# Patient Record
Sex: MALE | Race: BLACK OR AFRICAN AMERICAN | NOT HISPANIC OR LATINO | URBAN - METROPOLITAN AREA
[De-identification: names, ages, dates, MRNs, and addresses within clinical notes are randomized per-mention and may not be internally consistent; named-entity substitution may affect disease eponyms.]

---

## 2020-07-26 ENCOUNTER — EMERGENCY (EMERGENCY)
Facility: HOSPITAL | Age: 25
LOS: 1 days | Discharge: HOME | End: 2020-07-26
Attending: EMERGENCY MEDICINE | Admitting: EMERGENCY MEDICINE
Payer: COMMERCIAL

## 2020-07-26 VITALS
OXYGEN SATURATION: 97 % | DIASTOLIC BLOOD PRESSURE: 93 MMHG | TEMPERATURE: 99 F | RESPIRATION RATE: 15 BRPM | SYSTOLIC BLOOD PRESSURE: 137 MMHG | HEART RATE: 74 BPM

## 2020-07-26 VITALS
DIASTOLIC BLOOD PRESSURE: 99 MMHG | SYSTOLIC BLOOD PRESSURE: 146 MMHG | HEART RATE: 57 BPM | OXYGEN SATURATION: 100 % | RESPIRATION RATE: 16 BRPM | TEMPERATURE: 98 F

## 2020-07-26 LAB
ALBUMIN SERPL ELPH-MCNC: 3.7 G/DL — SIGNIFICANT CHANGE UP (ref 3.3–5)
ALP SERPL-CCNC: 63 U/L — SIGNIFICANT CHANGE UP (ref 40–120)
ALT FLD-CCNC: 20 U/L — SIGNIFICANT CHANGE UP (ref 12–78)
ANION GAP SERPL CALC-SCNC: 4 MMOL/L — LOW (ref 5–17)
APPEARANCE UR: CLEAR — SIGNIFICANT CHANGE UP
AST SERPL-CCNC: 14 U/L — LOW (ref 15–37)
BACTERIA # UR AUTO: NEGATIVE — SIGNIFICANT CHANGE UP
BASOPHILS # BLD AUTO: 0.02 K/UL — SIGNIFICANT CHANGE UP (ref 0–0.2)
BASOPHILS NFR BLD AUTO: 0.3 % — SIGNIFICANT CHANGE UP (ref 0–2)
BILIRUB SERPL-MCNC: 0.7 MG/DL — SIGNIFICANT CHANGE UP (ref 0.2–1.2)
BILIRUB UR-MCNC: NEGATIVE — SIGNIFICANT CHANGE UP
BUN SERPL-MCNC: 14 MG/DL — SIGNIFICANT CHANGE UP (ref 7–23)
CALCIUM SERPL-MCNC: 8.6 MG/DL — SIGNIFICANT CHANGE UP (ref 8.5–10.1)
CHLORIDE SERPL-SCNC: 104 MMOL/L — SIGNIFICANT CHANGE UP (ref 96–108)
CO2 SERPL-SCNC: 32 MMOL/L — HIGH (ref 22–31)
COLOR SPEC: YELLOW — SIGNIFICANT CHANGE UP
CREAT SERPL-MCNC: 1.1 MG/DL — SIGNIFICANT CHANGE UP (ref 0.5–1.3)
DIFF PNL FLD: NEGATIVE — SIGNIFICANT CHANGE UP
EOSINOPHIL # BLD AUTO: 0.04 K/UL — SIGNIFICANT CHANGE UP (ref 0–0.5)
EOSINOPHIL NFR BLD AUTO: 0.6 % — SIGNIFICANT CHANGE UP (ref 0–6)
EPI CELLS # UR: SIGNIFICANT CHANGE UP
GLUCOSE SERPL-MCNC: 81 MG/DL — SIGNIFICANT CHANGE UP (ref 70–99)
GLUCOSE UR QL: NEGATIVE — SIGNIFICANT CHANGE UP
HCT VFR BLD CALC: 47.6 % — SIGNIFICANT CHANGE UP (ref 39–50)
HGB BLD-MCNC: 15.3 G/DL — SIGNIFICANT CHANGE UP (ref 13–17)
HIV 1 & 2 AB SERPL IA.RAPID: SIGNIFICANT CHANGE UP
IMM GRANULOCYTES NFR BLD AUTO: 0.2 % — SIGNIFICANT CHANGE UP (ref 0–1.5)
KETONES UR-MCNC: NEGATIVE — SIGNIFICANT CHANGE UP
LEUKOCYTE ESTERASE UR-ACNC: NEGATIVE — SIGNIFICANT CHANGE UP
LIDOCAIN IGE QN: 129 U/L — SIGNIFICANT CHANGE UP (ref 73–393)
LYMPHOCYTES # BLD AUTO: 3.3 K/UL — SIGNIFICANT CHANGE UP (ref 1–3.3)
LYMPHOCYTES # BLD AUTO: 52.6 % — HIGH (ref 13–44)
MCHC RBC-ENTMCNC: 27.2 PG — SIGNIFICANT CHANGE UP (ref 27–34)
MCHC RBC-ENTMCNC: 32.1 GM/DL — SIGNIFICANT CHANGE UP (ref 32–36)
MCV RBC AUTO: 84.7 FL — SIGNIFICANT CHANGE UP (ref 80–100)
MONOCYTES # BLD AUTO: 0.38 K/UL — SIGNIFICANT CHANGE UP (ref 0–0.9)
MONOCYTES NFR BLD AUTO: 6.1 % — SIGNIFICANT CHANGE UP (ref 2–14)
NEUTROPHILS # BLD AUTO: 2.52 K/UL — SIGNIFICANT CHANGE UP (ref 1.8–7.4)
NEUTROPHILS NFR BLD AUTO: 40.2 % — LOW (ref 43–77)
NITRITE UR-MCNC: NEGATIVE — SIGNIFICANT CHANGE UP
NRBC # BLD: 0 /100 WBCS — SIGNIFICANT CHANGE UP (ref 0–0)
PH UR: 7 — SIGNIFICANT CHANGE UP (ref 5–8)
PLATELET # BLD AUTO: 211 K/UL — SIGNIFICANT CHANGE UP (ref 150–400)
POTASSIUM SERPL-MCNC: 4.1 MMOL/L — SIGNIFICANT CHANGE UP (ref 3.5–5.3)
POTASSIUM SERPL-SCNC: 4.1 MMOL/L — SIGNIFICANT CHANGE UP (ref 3.5–5.3)
PROT SERPL-MCNC: 7.3 G/DL — SIGNIFICANT CHANGE UP (ref 6–8.3)
PROT UR-MCNC: 15
RBC # BLD: 5.62 M/UL — SIGNIFICANT CHANGE UP (ref 4.2–5.8)
RBC # FLD: 13.6 % — SIGNIFICANT CHANGE UP (ref 10.3–14.5)
RBC CASTS # UR COMP ASSIST: NEGATIVE /HPF — SIGNIFICANT CHANGE UP (ref 0–4)
SODIUM SERPL-SCNC: 140 MMOL/L — SIGNIFICANT CHANGE UP (ref 135–145)
SP GR SPEC: 1 — LOW (ref 1.01–1.02)
T PALLIDUM AB TITR SER: NEGATIVE — SIGNIFICANT CHANGE UP
UROBILINOGEN FLD QL: NEGATIVE — SIGNIFICANT CHANGE UP
WBC # BLD: 6.27 K/UL — SIGNIFICANT CHANGE UP (ref 3.8–10.5)
WBC # FLD AUTO: 6.27 K/UL — SIGNIFICANT CHANGE UP (ref 3.8–10.5)
WBC UR QL: NEGATIVE — SIGNIFICANT CHANGE UP

## 2020-07-26 PROCEDURE — 87491 CHLMYD TRACH DNA AMP PROBE: CPT

## 2020-07-26 PROCEDURE — 96375 TX/PRO/DX INJ NEW DRUG ADDON: CPT

## 2020-07-26 PROCEDURE — 76870 US EXAM SCROTUM: CPT

## 2020-07-26 PROCEDURE — 96365 THER/PROPH/DIAG IV INF INIT: CPT

## 2020-07-26 PROCEDURE — 74176 CT ABD & PELVIS W/O CONTRAST: CPT

## 2020-07-26 PROCEDURE — 80053 COMPREHEN METABOLIC PANEL: CPT

## 2020-07-26 PROCEDURE — 86780 TREPONEMA PALLIDUM: CPT

## 2020-07-26 PROCEDURE — 96361 HYDRATE IV INFUSION ADD-ON: CPT

## 2020-07-26 PROCEDURE — 99284 EMERGENCY DEPT VISIT MOD MDM: CPT | Mod: 25

## 2020-07-26 PROCEDURE — 87591 N.GONORRHOEAE DNA AMP PROB: CPT

## 2020-07-26 PROCEDURE — 99285 EMERGENCY DEPT VISIT HI MDM: CPT

## 2020-07-26 PROCEDURE — 83690 ASSAY OF LIPASE: CPT

## 2020-07-26 PROCEDURE — 76870 US EXAM SCROTUM: CPT | Mod: 26

## 2020-07-26 PROCEDURE — 85027 COMPLETE CBC AUTOMATED: CPT

## 2020-07-26 PROCEDURE — 36415 COLL VENOUS BLD VENIPUNCTURE: CPT

## 2020-07-26 PROCEDURE — 87086 URINE CULTURE/COLONY COUNT: CPT

## 2020-07-26 PROCEDURE — 81001 URINALYSIS AUTO W/SCOPE: CPT

## 2020-07-26 PROCEDURE — 86703 HIV-1/HIV-2 1 RESULT ANTBDY: CPT

## 2020-07-26 PROCEDURE — 74176 CT ABD & PELVIS W/O CONTRAST: CPT | Mod: 26

## 2020-07-26 RX ORDER — SODIUM CHLORIDE 9 MG/ML
1000 INJECTION INTRAMUSCULAR; INTRAVENOUS; SUBCUTANEOUS ONCE
Refills: 0 | Status: COMPLETED | OUTPATIENT
Start: 2020-07-26 | End: 2020-07-26

## 2020-07-26 RX ORDER — CEFTRIAXONE 500 MG/1
1000 INJECTION, POWDER, FOR SOLUTION INTRAMUSCULAR; INTRAVENOUS ONCE
Refills: 0 | Status: COMPLETED | OUTPATIENT
Start: 2020-07-26 | End: 2020-07-26

## 2020-07-26 RX ORDER — KETOROLAC TROMETHAMINE 30 MG/ML
15 SYRINGE (ML) INJECTION ONCE
Refills: 0 | Status: DISCONTINUED | OUTPATIENT
Start: 2020-07-26 | End: 2020-07-26

## 2020-07-26 RX ORDER — CEFUROXIME AXETIL 250 MG
1 TABLET ORAL
Qty: 14 | Refills: 0
Start: 2020-07-26 | End: 2020-08-01

## 2020-07-26 RX ADMIN — CEFTRIAXONE 100 MILLIGRAM(S): 500 INJECTION, POWDER, FOR SOLUTION INTRAMUSCULAR; INTRAVENOUS at 15:22

## 2020-07-26 RX ADMIN — SODIUM CHLORIDE 1000 MILLILITER(S): 9 INJECTION INTRAMUSCULAR; INTRAVENOUS; SUBCUTANEOUS at 13:30

## 2020-07-26 RX ADMIN — SODIUM CHLORIDE 1000 MILLILITER(S): 9 INJECTION INTRAMUSCULAR; INTRAVENOUS; SUBCUTANEOUS at 15:16

## 2020-07-26 RX ADMIN — CEFTRIAXONE 1000 MILLIGRAM(S): 500 INJECTION, POWDER, FOR SOLUTION INTRAMUSCULAR; INTRAVENOUS at 16:14

## 2020-07-26 RX ADMIN — Medication 15 MILLIGRAM(S): at 15:22

## 2020-07-26 RX ADMIN — Medication 15 MILLIGRAM(S): at 15:23

## 2020-07-26 NOTE — ED PROVIDER NOTE - CARE PROVIDERS DIRECT ADDRESSES
[email or web address]

## 2020-07-26 NOTE — ED PROVIDER NOTE - CARE PROVIDER_API CALL
Kiley Kahn  INTERNAL MEDICINE  [address] [place] [address]   [place] [address]  Phone: [phone]  Fax: [phone]  Follow Up Time:     Xavier García)  Urology  [address] [facility] [address]  [place] [address]  Phone: [phone]  Fax: [phone]  Follow Up Time:

## 2020-07-26 NOTE — ED PROVIDER NOTE - NSFOLLOWUPINSTRUCTIONS_ED_ALL_ED_FT
1) Follow-up with your Primary Medical Doctor or referred doctor. Call today / next business day for prompt follow-up.  2) Return to Emergency room for any worsening or persistent pain, weakness, fever, dizziness, passing out, difficulty breathing or any other concerning symptoms.  3) See attached instruction sheets for additional information, including information regarding signs and symptoms to look out for, reasons to seek immediate care and other important instructions.  4) Follow-up with Urology - call tomorrow for prompt follow-up  5) Ceftin twice daily for 7 days

## 2020-07-26 NOTE — ED PROVIDER NOTE - CHPI ED SYMPTOMS NEG
no abdominal distension/no fever/no vomiting/no diarrhea/no burning urination/no hematuria/no blood in stool

## 2020-07-26 NOTE — ED PROVIDER NOTE - OBJECTIVE STATEMENT
23 yo M p/w generalized testicular pain x past ~ 2 -3 days. Pain also in lower abd. no known fever/chills. no cp/sob/palp . no neck / back pain. no dysuria / hematuria. no n/v/d. no change in appetite. no known risky sexual encounter. no hx std. No numb/ting/focal weak. No agg/allev factors. No other inj or co.

## 2020-07-26 NOTE — ED ADULT TRIAGE NOTE - CHIEF COMPLAINT QUOTE
referred from urgent care for abdominal pain and testicular pain x 1 week. urgent care reports normal testicular exam

## 2020-07-26 NOTE — ED PROVIDER NOTE - GENITOURINARY, MLM
No penile discharge. bl testicles with nl cremesteric bl, non-tender. No edema / crepitus. No erythema.

## 2020-07-26 NOTE — ED PROVIDER NOTE - PATIENT PORTAL LINK FT
You can access the FollowMyHealth Patient Portal offered by [facility] by registering at the following website: [email or web address]. By joining [employer]’s FollowMyHealth portal, you will also be able to view your health information using other applications (apps) compatible with our system.

## 2020-07-26 NOTE — ED ADULT NURSE NOTE - CHPI ED NUR SYMPTOMS NEG
no diarrhea/no nausea/no abdominal distension/no fever/no blood in stool/no chills/no dysuria/no hematuria

## 2020-07-26 NOTE — ED PROVIDER NOTE - PROGRESS NOTE DETAILS
Reevaluated patient at bedside.  Patient feeling much improved. no acute co or changes. Discussed the results of all diagnostic testing in ED.  An opportunity to ask questions was given.  Discussed the nature of diagnostic testing in the abdomen and the importance of continued reevaluation.  Understanding of the potential risk of occult pathology was verbalized and the patient will continue to follow-up as an outpatient for further workup and evaluation until resolution.  Discussed the importance of prompt, close medical follow-up.  Patient will return with any changes, concerns or persistent / worsening symptoms.

## 2020-07-27 LAB
C TRACH RRNA SPEC QL NAA+PROBE: SIGNIFICANT CHANGE UP
CULTURE RESULTS: SIGNIFICANT CHANGE UP
N GONORRHOEA RRNA SPEC QL NAA+PROBE: SIGNIFICANT CHANGE UP
SPECIMEN SOURCE: SIGNIFICANT CHANGE UP
SPECIMEN SOURCE: SIGNIFICANT CHANGE UP

## 2022-09-29 PROBLEM — Z00.00 ENCOUNTER FOR PREVENTIVE HEALTH EXAMINATION: Status: ACTIVE | Noted: 2022-09-29

## 2022-12-06 ENCOUNTER — OFFICE (OUTPATIENT)
Dept: URBAN - METROPOLITAN AREA CLINIC 63 | Facility: CLINIC | Age: 27
Setting detail: OPHTHALMOLOGY
End: 2022-12-06
Payer: COMMERCIAL

## 2022-12-06 DIAGNOSIS — H25.12: ICD-10-CM

## 2022-12-06 DIAGNOSIS — H16.223: ICD-10-CM

## 2022-12-06 DIAGNOSIS — H40.052: ICD-10-CM

## 2022-12-06 DIAGNOSIS — H31.002: ICD-10-CM

## 2022-12-06 DIAGNOSIS — H40.013: ICD-10-CM

## 2022-12-06 PROCEDURE — 92133 CPTRZD OPH DX IMG PST SGM ON: CPT | Performed by: STUDENT IN AN ORGANIZED HEALTH CARE EDUCATION/TRAINING PROGRAM

## 2022-12-06 PROCEDURE — 92014 COMPRE OPH EXAM EST PT 1/>: CPT | Performed by: STUDENT IN AN ORGANIZED HEALTH CARE EDUCATION/TRAINING PROGRAM

## 2022-12-06 ASSESSMENT — PACHYMETRY
OS_CT_CORRECTION: -4
OS_CT_UM: 606

## 2022-12-06 ASSESSMENT — SUPERFICIAL PUNCTATE KERATITIS (SPK)
OS_SPK: T
OD_SPK: T

## 2022-12-06 ASSESSMENT — TONOMETRY
OD_IOP_MMHG: 18
OS_IOP_MMHG: 18

## 2022-12-06 ASSESSMENT — CONFRONTATIONAL VISUAL FIELD TEST (CVF)
OS_FINDINGS: FULL
OD_FINDINGS: FULL

## 2022-12-06 ASSESSMENT — TEAR BREAK UP TIME (TBUT)
OD_TBUT: 1+
OS_TBUT: 1+

## 2022-12-07 ASSESSMENT — REFRACTION_MANIFEST
OS_CYLINDER: -2.00
OD_SPHERE: PLANO
OS_VA1: 20/30
OD_CYLINDER: -0.50
OS_SPHERE: +0.50
OS_AXIS: 018
OD_AXIS: 129
OD_VA1: 20/20

## 2022-12-07 ASSESSMENT — REFRACTION_CURRENTRX
OD_SPHERE: PLANO
OS_OVR_VA: 20/
OD_CYLINDER: -0.50
OS_SPHERE: +0.50
OD_OVR_VA: 20/
OD_AXIS: 128
OS_AXIS: 025
OS_CYLINDER: -2.00

## 2022-12-07 ASSESSMENT — KERATOMETRY
OD_AXISANGLE_DEGREES: 073
OS_AXISANGLE_DEGREES: 106
OD_K2POWER_DIOPTERS: 42.50
OS_K2POWER_DIOPTERS: 44.50
OS_K1POWER_DIOPTERS: 41.75
OD_K1POWER_DIOPTERS: 41.50

## 2022-12-07 ASSESSMENT — AXIALLENGTH_DERIVED
OS_AL: 23.9292
OS_AL: 24.1313
OD_AL: 24.1051

## 2022-12-07 ASSESSMENT — REFRACTION_AUTOREFRACTION
OD_CYLINDER: -0.25
OD_SPHERE: +0.25
OS_SPHERE: +0.50
OS_CYLINDER: -3.00
OS_AXIS: 018
OD_AXIS: 121

## 2022-12-07 ASSESSMENT — VISUAL ACUITY
OD_BCVA: 20/40
OS_BCVA: 20/20

## 2022-12-07 ASSESSMENT — SPHEQUIV_DERIVED
OS_SPHEQUIV: -1
OS_SPHEQUIV: -0.5
OD_SPHEQUIV: 0.125

## 2023-05-31 ENCOUNTER — OFFICE (OUTPATIENT)
Dept: URBAN - METROPOLITAN AREA CLINIC 63 | Facility: CLINIC | Age: 28
Setting detail: OPHTHALMOLOGY
End: 2023-05-31
Payer: COMMERCIAL

## 2023-05-31 DIAGNOSIS — B30.9: ICD-10-CM

## 2023-05-31 PROCEDURE — 92012 INTRM OPH EXAM EST PATIENT: CPT | Performed by: STUDENT IN AN ORGANIZED HEALTH CARE EDUCATION/TRAINING PROGRAM

## 2023-05-31 ASSESSMENT — TEAR BREAK UP TIME (TBUT)
OD_TBUT: 1+
OS_TBUT: 1+

## 2023-05-31 ASSESSMENT — SPHEQUIV_DERIVED
OS_SPHEQUIV: -1.625
OS_SPHEQUIV: -0.5
OD_SPHEQUIV: -0.375

## 2023-05-31 ASSESSMENT — REFRACTION_MANIFEST
OD_CYLINDER: -0.50
OS_VA1: 20/30
OS_AXIS: 018
OS_SPHERE: +0.50
OD_SPHERE: PLANO
OS_CYLINDER: -2.00
OD_AXIS: 129
OD_VA1: 20/20

## 2023-05-31 ASSESSMENT — REFRACTION_CURRENTRX
OD_AXIS: 128
OD_SPHERE: PLANO
OS_SPHERE: +0.50
OS_OVR_VA: 20/
OD_OVR_VA: 20/
OS_AXIS: 025
OD_CYLINDER: -0.50
OS_CYLINDER: -2.00

## 2023-05-31 ASSESSMENT — TONOMETRY
OD_IOP_MMHG: 14
OS_IOP_MMHG: 15

## 2023-05-31 ASSESSMENT — SUPERFICIAL PUNCTATE KERATITIS (SPK)
OD_SPK: T
OS_SPK: T

## 2023-05-31 ASSESSMENT — REFRACTION_AUTOREFRACTION
OS_CYLINDER: -4.25
OS_SPHERE: +0.50
OS_AXIS: 011
OD_CYLINDER: -0.75
OD_AXIS: 145
OD_SPHERE: 0.00

## 2023-05-31 ASSESSMENT — PACHYMETRY
OS_CT_CORRECTION: -4
OS_CT_UM: 606

## 2023-05-31 ASSESSMENT — VISUAL ACUITY
OS_BCVA: 20/20
OD_BCVA: 20/40-1

## 2023-06-04 ENCOUNTER — OFFICE (OUTPATIENT)
Dept: URBAN - METROPOLITAN AREA CLINIC 63 | Facility: CLINIC | Age: 28
Setting detail: OPHTHALMOLOGY
End: 2023-06-04
Payer: COMMERCIAL

## 2023-06-04 DIAGNOSIS — B30.9: ICD-10-CM

## 2023-06-04 PROCEDURE — 92012 INTRM OPH EXAM EST PATIENT: CPT | Performed by: STUDENT IN AN ORGANIZED HEALTH CARE EDUCATION/TRAINING PROGRAM

## 2023-06-04 ASSESSMENT — REFRACTION_MANIFEST
OD_SPHERE: PLANO
OD_VA1: 20/20
OD_CYLINDER: -0.50
OS_AXIS: 018
OS_CYLINDER: -2.00
OS_VA1: 20/30
OS_SPHERE: +0.50
OD_AXIS: 129

## 2023-06-04 ASSESSMENT — TEAR BREAK UP TIME (TBUT)
OD_TBUT: 1+
OS_TBUT: 1+

## 2023-06-04 ASSESSMENT — VISUAL ACUITY
OD_BCVA: 20/40-1
OS_BCVA: 20/20

## 2023-06-04 ASSESSMENT — SUPERFICIAL PUNCTATE KERATITIS (SPK)
OS_SPK: T
OD_SPK: T

## 2023-06-04 ASSESSMENT — REFRACTION_AUTOREFRACTION
OD_AXIS: 104
OS_AXIS: 015
OS_CYLINDER: -3.50
OD_CYLINDER: -0.75
OD_SPHERE: +0.25
OS_SPHERE: +0.25

## 2023-06-04 ASSESSMENT — AXIALLENGTH_DERIVED
OS_AL: 23.8821
OS_AL: 24.2881
OD_AL: 24.2556

## 2023-06-04 ASSESSMENT — KERATOMETRY
OS_K1POWER_DIOPTERS: 41.75
OD_AXISANGLE_DEGREES: 038
OS_K2POWER_DIOPTERS: 44.75
OD_K1POWER_DIOPTERS: 41.50
OD_K2POWER_DIOPTERS: 42.25
OS_AXISANGLE_DEGREES: 104

## 2023-06-04 ASSESSMENT — SPHEQUIV_DERIVED
OD_SPHEQUIV: -0.125
OS_SPHEQUIV: -1.5
OS_SPHEQUIV: -0.5

## 2023-06-04 ASSESSMENT — TONOMETRY
OD_IOP_MMHG: 15
OS_IOP_MMHG: 16

## 2023-06-04 ASSESSMENT — REFRACTION_CURRENTRX
OD_AXIS: 135
OS_VPRISM_DIRECTION: SV
OS_AXIS: 018
OD_OVR_VA: 20/
OD_CYLINDER: -0.50
OS_SPHERE: +0.75
OS_OVR_VA: 20/
OD_SPHERE: PLANO
OD_VPRISM_DIRECTION: SV
OS_CYLINDER: -2.25

## 2023-06-04 ASSESSMENT — PACHYMETRY
OS_CT_CORRECTION: -4
OS_CT_UM: 606

## 2023-06-04 ASSESSMENT — CONFRONTATIONAL VISUAL FIELD TEST (CVF)
OD_FINDINGS: FULL
OS_FINDINGS: FULL

## 2023-08-15 ENCOUNTER — NON-APPOINTMENT (OUTPATIENT)
Age: 28
End: 2023-08-15

## 2023-11-08 ENCOUNTER — EMERGENCY (EMERGENCY)
Facility: HOSPITAL | Age: 28
LOS: 0 days | Discharge: HOME | End: 2023-11-08
Attending: EMERGENCY MEDICINE
Payer: OTHER MISCELLANEOUS

## 2023-11-08 VITALS
HEIGHT: 70 IN | OXYGEN SATURATION: 100 % | DIASTOLIC BLOOD PRESSURE: 114 MMHG | RESPIRATION RATE: 18 BRPM | SYSTOLIC BLOOD PRESSURE: 148 MMHG | WEIGHT: 179.9 LBS | HEART RATE: 70 BPM | TEMPERATURE: 98 F

## 2023-11-08 DIAGNOSIS — M25.561 PAIN IN RIGHT KNEE: ICD-10-CM

## 2023-11-08 DIAGNOSIS — S83.92XA SPRAIN OF UNSPECIFIED SITE OF LEFT KNEE, INITIAL ENCOUNTER: ICD-10-CM
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
## 2023-11-08 DIAGNOSIS — S83.91XA SPRAIN OF UNSPECIFIED SITE OF RIGHT KNEE, INITIAL ENCOUNTER: ICD-10-CM
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
## 2023-11-08 PROBLEM — Z78.9 OTHER SPECIFIED HEALTH STATUS: Chronic | Status: ACTIVE | Noted: 2020-07-26

## 2023-11-08 PROCEDURE — 99284 EMERGENCY DEPT VISIT MOD MDM: CPT

## 2023-11-08 PROCEDURE — 99283 EMERGENCY DEPT VISIT LOW MDM: CPT

## 2023-11-08 PROCEDURE — 73564 X-RAY EXAM KNEE 4 OR MORE: CPT | Mod: 26,RT

## 2023-11-08 PROCEDURE — 99053 MED SERV 10PM-8AM 24 HR FAC: CPT

## 2023-11-08 PROCEDURE — 73564 X-RAY EXAM KNEE 4 OR MORE: CPT | Mod: RT

## 2023-11-08 RX ORDER — IBUPROFEN 200 MG
600 TABLET ORAL ONCE
Refills: 0 | Status: COMPLETED | OUTPATIENT
Start: 2023-11-08 | End: 2023-11-08

## 2023-11-08 RX ORDER — OXYCODONE AND ACETAMINOPHEN 5; 325 MG/1; MG/1
1 TABLET ORAL
Qty: 12 | Refills: 0
Start: 2023-11-08 | End: 2023-11-10

## 2023-11-08 RX ADMIN — Medication 600 MILLIGRAM(S): at 04:18

## 2023-11-08 NOTE — ED PROVIDER NOTE - PHYSICAL EXAMINATION
General: AAOx3, NAD  HEENT: NCAT  Cardiac: Normal rate, normal peripheral perfusion  Respiratory: Normal rate and effort  GI: Soft, nondistended  Neuro: No focal deficits. BRANCH equally x4  MSK: FROMx4, no peripheral edema. +mild ttp medial anterior R knee superior to tibial plateau. No swelling/effusion. Full active and passive ROM. Neg anterior draw.  Skin: No rash

## 2023-11-08 NOTE — ED PROVIDER NOTE - PATIENT PORTAL LINK FT
You can access the FollowMyHealth Patient Portal offered by [facility] by registering at the following website: [email or web address]. By joining [facility]’s FollowMyHealth portal, you will also be able to view your health information using other applications (apps) compatible with our system.
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
## 2023-11-08 NOTE — ED PROVIDER NOTE - CARE PROVIDER_API CALL
German [place]  Orthopaedic Surgery  [address] [place] [id number]  Phone: [phone]  Fax: [phone]  Follow Up Time:

## 2023-11-08 NOTE — ED PROVIDER NOTE - NSFOLLOWUPINSTRUCTIONS_ED_ALL_ED_FT
Return to the Emergency Department for worsening or persistent symptoms, and/or ANY NEW OR CONCERNING SYMPTOMS. If you have issues obtaining follow up, please call: [phone] or [phone]  to obtain a doctor or specialist who takes your insurance in your area.    Knee Sprain    WHAT YOU NEED TO KNOW:    A knee sprain is a stretched or torn ligament in your knee. Ligaments support the knee and keep the joint and bones in the correct position. A knee sprain may involve one or more ligaments.  Knee Anatomy     DISCHARGE INSTRUCTIONS:    Seek care immediately if:    Any part of your leg feels cold, numb, or looks pale.    Call your doctor if:    You have new or increased swelling, bruising, or pain in your knee.    Your symptoms do not improve within 6 weeks, even with treatment.    You have questions or concerns about your condition or care.  Medicines:    NSAIDs, such as ibuprofen, help decrease swelling, pain, and fever. This medicine is available with or without a doctor's order. NSAIDs can cause stomach bleeding or kidney problems in certain people. If you take blood thinner medicine, always ask your healthcare provider if NSAIDs are safe for you. Always read the medicine label and follow directions.    Acetaminophen decreases pain and fever. It is available without a doctor's order. Ask how much to take and how often to take it. Follow directions. Read the labels of all other medicines you are using to see if they also contain acetaminophen, or ask your doctor or pharmacist. Acetaminophen can cause liver damage if not taken correctly.    Prescription pain medicine may be given. Ask your healthcare provider how to take this medicine safely. Some prescription pain medicines contain acetaminophen. Do not take other medicines that contain acetaminophen without talking to your healthcare provider. Too much acetaminophen may cause liver damage. Prescription pain medicine may cause constipation. Ask your healthcare provider how to prevent or treat constipation.    Take your medicine as directed. Contact your healthcare provider if you think your medicine is not helping or if you have side effects. Tell your provider if you are allergic to any medicine. Keep a list of the medicines, vitamins, and herbs you take. Include the amounts, and when and why you take them. Bring the list or the pill bottles to follow-up visits. Carry your medicine list with you in case of an emergency.  A support device such as a splint or brace may be needed. These devices limit movement and protect the joint while it heals. You may be given crutches to use until you can stand on your injured leg without pain.    Physical therapy may be needed. A physical therapist teaches you exercises to help improve movement and strength, and to decrease pain.    Manage a knee sprain:    Rest your knee and do not exercise. Do not walk on your injured leg if you are told to keep weight off your knee. Rest helps decrease swelling and allows the injury to heal. You can do gentle range of motion exercises as directed to prevent stiffness.    Apply ice on your knee for 15 to 20 minutes every hour or as directed. Use an ice pack, or put crushed ice in a plastic bag. Cover the bag with a towel before you apply it. Ice helps prevent tissue damage and decreases swelling and pain.    Apply compression to your knee as directed. You may need to wear an elastic bandage. This helps keep your injured knee from moving too much while it heals. It should be tight enough to give support but so tight that it causes your toes to feel numb or tingly. Take the bandage off and rewrap it at least 1 time each day.  How to Wrap an Elastic Bandage      Elevate your knee above the level of your heart as often as you can. This will help decrease swelling and pain. Prop your leg on pillows or blankets to keep it elevated comfortably. Do not put pillows directly behind your knee.  Elevate Leg  Prevent another knee sprain: Exercise your legs to keep your muscles strong. Strong leg muscles help protect your knee and prevent strain. The following may also prevent a knee sprain:    Slowly start your exercise or training program. Slowly increase the time, distance, and intensity of your exercise. Sudden increases in training may cause another knee sprain.    Wear protective braces and equipment as directed. Braces may prevent your knee from moving the wrong way and causing another sprain. Protective equipment may support your bones and ligaments to prevent injury.    Warm up and stretch before exercise. Warm up by walking or using an exercise bike before starting your regular exercise. Do gentle stretches after warming up. This helps to loosen your muscles and decrease stress on your knee. Cool down and stretch after you exercise.  Warm up and Cool Down       Wear shoes that fit correctly and support your feet. Replace your running or exercise shoes before the padding or shock absorption is worn out. Ask your healthcare provider which exercise shoes are best for you. Ask if you should wear shoe inserts. Shoe inserts can help support your heels and arches or keep your foot lined up correctly in your shoes. Exercise on flat surfaces.  Follow up with your doctor as directed: Write down your questions so you remember to ask them during your visits.
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
## 2023-12-12 ENCOUNTER — OFFICE (OUTPATIENT)
Dept: URBAN - METROPOLITAN AREA CLINIC 110 | Facility: CLINIC | Age: 28
Setting detail: OPHTHALMOLOGY
End: 2023-12-12
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
## 2023-12-12 PROCEDURE — NO SHOW FE NO SHOW FEE: Performed by: STUDENT IN AN ORGANIZED HEALTH CARE EDUCATION/TRAINING PROGRAM

## 2024-02-13 ENCOUNTER — NON-APPOINTMENT (OUTPATIENT)
Age: 29
End: 2024-02-13

## 2024-06-10 ENCOUNTER — NON-APPOINTMENT (OUTPATIENT)
Age: 29
End: 2024-06-10

## 2024-09-18 ENCOUNTER — OFFICE (OUTPATIENT)
Dept: URBAN - METROPOLITAN AREA CLINIC 63 | Facility: CLINIC | Age: 29
Setting detail: OPHTHALMOLOGY
End: 2024-09-18
Payer: COMMERCIAL

## 2024-09-18 DIAGNOSIS — H00.12: ICD-10-CM

## 2024-09-18 DIAGNOSIS — H52.222: ICD-10-CM

## 2024-09-18 DIAGNOSIS — H53.002: ICD-10-CM

## 2024-09-18 PROCEDURE — 92012 INTRM OPH EXAM EST PATIENT: CPT | Performed by: STUDENT IN AN ORGANIZED HEALTH CARE EDUCATION/TRAINING PROGRAM

## 2024-09-18 ASSESSMENT — CONFRONTATIONAL VISUAL FIELD TEST (CVF)
OS_FINDINGS: FULL
OD_FINDINGS: FULL

## 2024-09-30 ENCOUNTER — NON-APPOINTMENT (OUTPATIENT)
Age: 29
End: 2024-09-30

## 2024-11-06 ENCOUNTER — NON-APPOINTMENT (OUTPATIENT)
Age: 29
End: 2024-11-06

## 2025-01-08 NOTE — ED ADULT TRIAGE NOTE - RESPIRATORY RATE (BREATHS/MIN)
18

## 2025-01-21 ENCOUNTER — NON-APPOINTMENT (OUTPATIENT)
Age: 30
End: 2025-01-21

## 2025-02-01 ENCOUNTER — EMERGENCY (EMERGENCY)
Facility: HOSPITAL | Age: 30
LOS: 1 days | Discharge: HOME | End: 2025-02-01
Attending: EMERGENCY MEDICINE | Admitting: EMERGENCY MEDICINE
Payer: SELF-PAY

## 2025-02-01 VITALS
HEART RATE: 56 BPM | TEMPERATURE: 98 F | RESPIRATION RATE: 20 BRPM | HEIGHT: 71 IN | SYSTOLIC BLOOD PRESSURE: 129 MMHG | OXYGEN SATURATION: 98 % | WEIGHT: 205.03 LBS | DIASTOLIC BLOOD PRESSURE: 83 MMHG

## 2025-02-01 VITALS
OXYGEN SATURATION: 99 % | DIASTOLIC BLOOD PRESSURE: 81 MMHG | HEART RATE: 62 BPM | SYSTOLIC BLOOD PRESSURE: 125 MMHG | RESPIRATION RATE: 19 BRPM | TEMPERATURE: 98 F

## 2025-02-01 PROCEDURE — 99284 EMERGENCY DEPT VISIT MOD MDM: CPT | Mod: 25

## 2025-02-01 PROCEDURE — 73130 X-RAY EXAM OF HAND: CPT | Mod: 26,RT

## 2025-02-01 PROCEDURE — 73110 X-RAY EXAM OF WRIST: CPT

## 2025-02-01 PROCEDURE — 72070 X-RAY EXAM THORAC SPINE 2VWS: CPT

## 2025-02-01 PROCEDURE — 72070 X-RAY EXAM THORAC SPINE 2VWS: CPT | Mod: 26

## 2025-02-01 PROCEDURE — 73110 X-RAY EXAM OF WRIST: CPT | Mod: 26,RT

## 2025-02-01 PROCEDURE — 73130 X-RAY EXAM OF HAND: CPT

## 2025-02-01 PROCEDURE — 99284 EMERGENCY DEPT VISIT MOD MDM: CPT

## 2025-02-01 RX ORDER — IBUPROFEN 600 MG/1
600 TABLET, FILM COATED ORAL ONCE
Refills: 0 | Status: COMPLETED | OUTPATIENT
Start: 2025-02-01 | End: 2025-02-01

## 2025-02-01 RX ADMIN — IBUPROFEN 600 MILLIGRAM(S): 600 TABLET, FILM COATED ORAL at 15:17
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
## 2025-02-01 NOTE — ED ADULT NURSE NOTE - HOW OFTEN DO YOU HAVE A DRINK CONTAINING ALCOHOL?
12/19/2019              [address] [facility] [address] [place]         Dear [facility] records indicate that the tests ordered for you by Jolene Teixeira MD  have not been done.   If you have, in fact, already comp Never

## 2025-02-01 NOTE — ED PROVIDER NOTE - PATIENT PORTAL LINK FT
You can access the FollowMyHealth Patient Portal offered by [facility] by registering at the following website: [email or web address]. By joining [email or web address]’s FollowMyHealth portal, you will also be able to view your health information using other applications (apps) compatible with our system.

## 2025-02-01 NOTE — ED ADULT NURSE NOTE - NS ED NURSE RECORD ANOTHER VITAL SIGN
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     - Labs: Daily CMP, CBC, Mag, Phos. Replace lytes as needed  - Diet: NPO  - Pain: MMD  - Fluids: Maintenance fluids  - DVT prophylaxis: subq lovenox since not bleeding  - Tele  -PPI  -Home meds  - Transfer to [facility] when bed available                 Yes
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
## 2025-02-01 NOTE — ED PROVIDER NOTE - NSFOLLOWUPINSTRUCTIONS_ED_ALL_ED_FT
Follow up with your personal physician in 1-3 days.  Advil 3 tablets (600mg) every 8 hours with food as needed for pain.  Apply cold packs 10 minutes on/10 minutes off.  Please return to the Emergency Department immediately for any problems or concerns. Follow up with your personal physician in 1-3 days.  Advil 3 tablets (600mg) every 8 hours with food as needed for pain.  Apply cold packs 10 minutes on/10 minutes off.  Please return to the Emergency Department immediately for any problems or concerns.    [employer]® CareNotes®  :  [facility]        THORACIC BACK STRAIN - AfterCare(R) Instructions(ER/ED)    Thoracic Back Strain    WHAT YOU NEED TO KNOW:    A thoracic back strain is a muscle or tendon injury in your upper or middle back. You may have pain, muscle spasms, swelling, or stiffness. A mild strain may cause minor pain that goes away in a few days. A more severe strain may cause the muscle or tendon to tear. There is a very small chance you may need surgery to fix the tear.    DISCHARGE INSTRUCTIONS:    Call your local emergency number (911 in the [place]) for any of the following:    You have chest pain or shortness of breath.    Return to the emergency department if:    You have severe pain, or pain that spreads from your back to other areas.    You have new or increased swelling or redness in the injured area.  Call your doctor if:    You have questions or concerns about your condition or care.    Medicines: You may need any of the following:    Prescription pain medicine may be given. Ask your healthcare provider how to take this medicine safely. Some prescription pain medicines contain acetaminophen. Do not take other medicines that contain acetaminophen without talking to your healthcare provider. Too much acetaminophen may cause liver damage. Prescription pain medicine may cause constipation. Ask your healthcare provider how to prevent or treat constipation.    NSAIDs, such as ibuprofen, help decrease swelling, pain, and fever. This medicine is available with or without a doctor's order. NSAIDs can cause stomach bleeding or kidney problems in certain people. If you take blood thinner medicine, always ask your healthcare provider if NSAIDs are safe for you. Always read the medicine label and follow directions.    Muscle relaxers help prevent or treat spasms.    Take your medicine as directed. Contact your healthcare provider if you think your medicine is not helping or if you have side effects. Tell your provider if you are allergic to any medicine. Keep a list of the medicines, vitamins, and herbs you take. Include the amounts, and when and why you take them. Bring the list or the pill bottles to follow-up visits. Carry your medicine list with you in case of an emergency.  Self-care:    Rest as directed. Move slowly and carefully. Do not lift heavy objects.    Apply ice or heat as directed. Ice decreases pain and swelling and may help decrease tissue damage. Heat helps decrease muscle spasms. Your healthcare provider may tell you to apply only ice for the first 24 hours to help reduce swelling. Apply ice or heat to the area for 20 minutes every hour, or as directed. Ask how many times to do this each day, and for how many days.    Use an elastic wrap or back brace as directed. These will help keep the injured area from moving so it can heal.  How to Wrap an Elastic Bandage      Go to physical therapy as directed. A physical therapist can teach you exercises to help strengthen your back. He or she can also teach you safe ways to bend and move so you do not cause more injury.  Prevent another thoracic back strain:    Lift objects carefully. Ask someone to help you lift heavy objects. If you must lift an object by yourself, do not use your back muscles to lift. Lift with your legs.  How to Lift Items Safely      Check your posture. Keep your upper body lifted and your head up. Poor posture can cause back strain or make it worse. Adjust your position if you work in front of a computer. You may need arm or wrist supports or change the height of your chair.    Exercise as directed. Exercise can help strengthen your muscles and make you more flexible. Do not exercise or play sports when you are tired. Always warm up before you start and cool down when you finish.  Warm up and Cool Down       Stretch your muscles as directed. Keep your muscles limber by stretching every day. Stretch before you exercise.  Follow up with your doctor as directed: You may need more tests to check for other injuries or to see how your injury is healing. You may also need to see a specialist. Write down your questions so you remember to ask them during your visits.    © [employer] 1973, 2025 Follow up with your personal physician in 1-3 days.  Advil 3 tablets (600mg) every 8 hours with food as needed for pain.  Apply cold packs 10 minutes on/10 minutes off.  Please return to the Emergency Department immediately for any problems or concerns.    [employer] CareNotes®  :  [facility]        THORACIC BACK STRAIN - AfterCare(R) Instructions(ER/ED)    Thoracic Back Strain    WHAT YOU NEED TO KNOW:    A thoracic back strain is a muscle or tendon injury in your upper or middle back. You may have pain, muscle spasms, swelling, or stiffness. A mild strain may cause minor pain that goes away in a few days. A more severe strain may cause the muscle or tendon to tear. There is a very small chance you may need surgery to fix the tear.    DISCHARGE INSTRUCTIONS:    Call your local emergency number (911 in the [place]) for any of the following:    You have chest pain or shortness of breath.    Return to the emergency department if:    You have severe pain, or pain that spreads from your back to other areas.    You have new or increased swelling or redness in the injured area.  Call your doctor if:    You have questions or concerns about your condition or care.    Medicines: You may need any of the following:    Prescription pain medicine may be given. Ask your healthcare provider how to take this medicine safely. Some prescription pain medicines contain acetaminophen. Do not take other medicines that contain acetaminophen without talking to your healthcare provider. Too much acetaminophen may cause liver damage. Prescription pain medicine may cause constipation. Ask your healthcare provider how to prevent or treat constipation.    NSAIDs, such as ibuprofen, help decrease swelling, pain, and fever. This medicine is available with or without a doctor's order. NSAIDs can cause stomach bleeding or kidney problems in certain people. If you take blood thinner medicine, always ask your healthcare provider if NSAIDs are safe for you. Always read the medicine label and follow directions.    Muscle relaxers help prevent or treat spasms.    Take your medicine as directed. Contact your healthcare provider if you think your medicine is not helping or if you have side effects. Tell your provider if you are allergic to any medicine. Keep a list of the medicines, vitamins, and herbs you take. Include the amounts, and when and why you take them. Bring the list or the pill bottles to follow-up visits. Carry your medicine list with you in case of an emergency.  Self-care:    Rest as directed. Move slowly and carefully. Do not lift heavy objects.    Apply ice or heat as directed. Ice decreases pain and swelling and may help decrease tissue damage. Heat helps decrease muscle spasms. Your healthcare provider may tell you to apply only ice for the first 24 hours to help reduce swelling. Apply ice or heat to the area for 20 minutes every hour, or as directed. Ask how many times to do this each day, and for how many days.    Use an elastic wrap or back brace as directed. These will help keep the injured area from moving so it can heal.  How to Wrap an Elastic Bandage      Go to physical therapy as directed. A physical therapist can teach you exercises to help strengthen your back. He or she can also teach you safe ways to bend and move so you do not cause more injury.  Prevent another thoracic back strain:    Lift objects carefully. Ask someone to help you lift heavy objects. If you must lift an object by yourself, do not use your back muscles to lift. Lift with your legs.  How to Lift Items Safely      Check your posture. Keep your upper body lifted and your head up. Poor posture can cause back strain or make it worse. Adjust your position if you work in front of a computer. You may need arm or wrist supports or change the height of your chair.    Exercise as directed. Exercise can help strengthen your muscles and make you more flexible. Do not exercise or play sports when you are tired. Always warm up before you start and cool down when you finish.  Warm up and Cool Down       Stretch your muscles as directed. Keep your muscles limber by stretching every day. Stretch before you exercise.  Follow up with your doctor as directed: You may need more tests to check for other injuries or to see how your injury is healing. You may also need to see a specialist. Write down your questions so you remember to ask them during your visits.    © "[employer] US L.P. 1973, 2025    [employer]® CareNotes®  :  [facility]        WRIST SPRAIN - AfterCare(R) Instructions(ER/ED)    Wrist Sprain    WHAT YOU NEED TO KNOW:    A wrist sprain happens when one or more ligaments in your wrist stretch or tear. Ligaments are tough tissues that connect bones and keep them in place, and support your joints.    DISCHARGE INSTRUCTIONS:    Return to the emergency department if:    You have severe pain or swelling.    Your injured wrist is red or has red streaks spreading from the injured area.    You have new trouble moving your hands, fingers, or wrist.    Your wrist, hand, or fingers feel cold or numb.    Your fingernails turn blue or gray.  Call your doctor if:    Your symptoms get worse.    You have pain and swelling for more than 48 hours.    You have questions or concerns about your condition or care.  Medicines: You may need any of the following:    NSAIDs, such as ibuprofen, help decrease swelling, pain, and fever. NSAIDs can cause stomach bleeding or kidney problems in certain people. If you take blood thinner medicine, always ask your healthcare provider if NSAIDs are safe for you. Always read the medicine label and follow directions.    Acetaminophen decreases pain and fever. It is available without a doctor's order. Ask how much to take and how often to take it. Follow directions. Read the labels of all other medicines you are using to see if they also contain acetaminophen, or ask your doctor or pharmacist. Acetaminophen can cause liver damage if not taken correctly.    Take your medicine as directed. Contact your healthcare provider if you think your medicine is not helping or if you have side effects. Tell your provider if you are allergic to any medicine. Keep a list of the medicines, vitamins, and herbs you take. Include the amounts, and when and why you take them. Bring the list or the pill bottles to follow-up visits. Carry your medicine list with you in case of an emergency.  Self-care:    Rest your wrist for at least 48 hours. Avoid activities that cause pain.    Ice your wrist for 15 to 20 minutes every hour or as directed. Use an ice pack, or put crushed ice in a plastic bag. Cover it with a towel before you put it on your wrist. Ice helps prevent tissue damage and decreases swelling and pain.    Compress your wrist with an elastic bandage. This will help decrease swelling, support your wrist, and help it heal. Wear your wrist wrap as directed. The elastic bandage should be snug but not tight.  How to Wrap an Elastic Bandage      Elevate your wrist above the level of your heart as often as you can. This will help decrease swelling and pain. Prop your wrist on pillows or blankets to keep it elevated comfortably.    Wrist support: You may need to wear a splint or cast to support your wrist and prevent more damage. Wear your splint as directed. Ask for instructions on how to bathe while you are wearing a splint or cast.    Physical therapy: Your healthcare provider may recommend that you go to physical therapy. A physical therapist teaches you exercises to help improve movement and strength, and to decrease pain.    Follow up with your doctor as directed: Write down your questions so you remember to ask them during your visits.    © [employer] 1973, 2025

## 2025-02-01 NOTE — ED PROVIDER NOTE - CARE PROVIDER_API CALL
Scout Nazario  [address] [place] [address]  Phone: [phone]  Fax: [phone]  Established Patient  Follow Up Time: 1-3 Days
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
## 2025-02-01 NOTE — ED PROVIDER NOTE - PHYSICAL EXAMINATION
Gen: Awake, Alert, WD, WN, NAD  Head:  NC/AT  Eyes:  PERRL, EOMI, Conjunctiva pink, lids normal, no scleral icterus  Neck: supple, nontender,  trachea midline  Cardiac/CV:  S1 S2, RRR, no M/G/R  Chest: nontender, no crepitus  Respiratory/Pulm:  CTAB, good air movement, normal resp effort  Gastrointestinal/Abdomen:  Soft, nontender, nondistended  Pelvis: stable, nontender, Hips: FROM, nontender  Back:  no CVAT, no MLT, + bilateral mid-lower T-spine paraspinal muscle reproducible tenderness, no step-offs, no deformities  Ext:  warm, well perfused, moving all extremities spontaneously, no shoulder/elbow/forearm tenderness bilaterally, +mild ttp bilateral wrists with no edema, no cyanosis, no erythema, radial pulses intact 2+, +FROM bilateral UEs all joints  Skin: intact, no rash, no vesicles, no petechiae, no ecchymosis  Neuro:  AAOx3, sensation intact, motor 5/5 x 4 extremities, speech clear

## 2025-02-01 NOTE — ED ADULT TRIAGE NOTE - RESPIRATORY RATE (BREATHS/MIN)
20 Adjacent Tissue Transfer Text: The defect edges were debeveled with a #15 scalpel blade. Given the location of the defect and the proximity to free margins an adjacent tissue transfer was deemed most appropriate. Using a sterile surgical marker, an appropriate flap was drawn incorporating the defect and placing the expected incisions within the relaxed skin tension lines where possible. The area thus outlined was incised deep to adipose tissue with a #15 scalpel blade. The skin margins were undermined to an appropriate distance in all directions utilizing iris scissors and carried over to close the primary defect.

## 2025-02-01 NOTE — ED ADULT TRIAGE NOTE - CHIEF COMPLAINT QUOTE
pt ambulated into triage C/O right hand, wrist, and back pain since work yesterday. pt is a , denies head strikes.

## 2025-07-13 ENCOUNTER — NON-APPOINTMENT (OUTPATIENT)
Age: 30
End: 2025-07-13